# Patient Record
Sex: FEMALE | Race: WHITE | NOT HISPANIC OR LATINO | Employment: FULL TIME | URBAN - METROPOLITAN AREA
[De-identification: names, ages, dates, MRNs, and addresses within clinical notes are randomized per-mention and may not be internally consistent; named-entity substitution may affect disease eponyms.]

---

## 2017-01-02 DIAGNOSIS — Q05.6 THORACIC SPINAL MENINGOCELE (H): Primary | ICD-10-CM

## 2017-03-20 ENCOUNTER — OFFICE VISIT (OUTPATIENT)
Dept: NEUROSURGERY | Facility: CLINIC | Age: 47
End: 2017-03-20

## 2017-03-20 VITALS
HEART RATE: 99 BPM | BODY MASS INDEX: 29.3 KG/M2 | SYSTOLIC BLOOD PRESSURE: 136 MMHG | DIASTOLIC BLOOD PRESSURE: 85 MMHG | HEIGHT: 61 IN | WEIGHT: 155.2 LBS

## 2017-03-20 DIAGNOSIS — Q85.01 NEUROFIBROMATOSIS, TYPE 1 (H): ICD-10-CM

## 2017-03-20 DIAGNOSIS — Q05.9 MENINGOCELE SPINAL (H): Primary | ICD-10-CM

## 2017-03-20 ASSESSMENT — PAIN SCALES - GENERAL: PAINLEVEL: NO PAIN (0)

## 2017-03-20 NOTE — LETTER
"3/20/2017       RE: Esperanza Cortez  [address] [facility] [address]     Dear Colleague,    Thank you for referring your patient, Esperanza Cortez, to the [facility] NEUROSURGERY at [facility]. Please see a copy of my visit note below.    It was a pleasure to see Esperanza Cortez today in Neurosurgery Clinic. she is a 47 year old female who underwent repair of a thoracic meningocele in 2013. She is here for routine follow up. Otherwise doing reasonably well. Similar R abdominal wall complaints.    Vitals:    03/20/17 1039   BP: 136/85   BP Location: Left arm   Patient Position: Chair   Cuff Size: Adult Large   Pulse: 99   Weight: 70.4 kg (155 lb 3.2 oz)   Height: 1.549 m (5' 1\")     Body mass index is 29.32 kg/(m^2).  No Pain (0)    Awake, alert.  Neuro intact.    CXR stable from 2013.    A: s/p repair thoracic meningocele.    P: RTC 3 years with CXR.    Again, thank you for allowing me to participate in the care of your patient.      Sincerely,    Félix Fregoso MD      "

## 2017-03-20 NOTE — MR AVS SNAPSHOT
After Visit Summary   3/20/2017    Esperanza Cortez    MRN: [id number]           Patient Information     Date Of Birth          1970        Visit Information        Provider Department      3/20/2017 11:30 AM Félix Fregoso MD [facility] Neurosurgery        Today's Diagnoses     Meningocele spinal (H)    -  1    Neurofibromatosis, type 1 (H)          Care Instructions    PT. WILL CALL TO SCHED. APPT. -KB        Follow-ups after your visit        Additional Services     PT Referral (External Facility)       Physical Therapy Location:  Patient preference.    Eval and treat for right body pain and scar tissue. No restrictions. Scar tissue release.                  Follow-up notes from your care team     Return in about 3 years (around 3/20/2020) for Imaging.      Who to contact     Please call your clinic at [phone] to:    Ask questions about your health    Make or cancel appointments    Discuss your medicines    Learn about your test results    Speak to your doctor   If you have compliments or concerns about an experience at your clinic, or if you wish to file a complaint, please contact [facility] Patient Relations at [phone] or email us at [email or web address]         Additional Information About Your Visit        MyChart Information     Voxel (Internap)t gives you secure access to your electronic health record. If you see a primary care provider, you can also send messages to your care team and make appointments. If you have questions, please call your primary care clinic.  If you do not have a primary care provider, please call [phone] and they will assist you.      ChaseFuture is an electronic gateway that provides easy, online access to your medical records. With Chase[employer], you can request a clinic appointment, read your test results, renew a prescription or communicate with your care team.     To access your existing account, please contact your [place]  [facility] [place] [facility] or call [phone] for assistance.        Care EveryWhere ID     This is your Care EveryWhere ID. This could be used by other organizations to access your [place] medical records  [id number]        Your Vitals Were     Pulse Height BMI (Body Mass Index)             99 1.549 m (5' 1\") 29.32 kg/m2          Blood Pressure from Last 3 Encounters:   03/20/17 136/85   03/17/14 132/86   10/28/13 110/77    Weight from Last 3 Encounters:   03/20/17 70.4 kg (155 lb 3.2 oz)   03/17/14 55.8 kg (123 lb)   10/28/13 56.1 kg (123 lb 9.6 oz)              We Performed the Following     PT Referral (External Facility)        Primary Care Provider Office Phone # Fax #    Arti Regan [phone]       [facility] [address] [place], [place] [id number]        Thank you!     Thank you for choosing [facility]  for your care. Our goal is always to provide you with excellent care. Hearing back from our patients is one way we can continue to improve our services. Please take a few minutes to complete the written survey that you may receive in the mail after your visit with us. Thank you!             Your Updated Medication List - Protect others around you: Learn how to safely use, store and throw away your medicines at www.disposemymeds.org.          This list is accurate as of: 3/20/17 11:59 PM.  Always use your most recent med list.                   Brand Name Dispense Instructions for use    ATENOLOL PO          CYCLOBENZAPRINE HCL PO          FLONASE 50 MCG/ACT spray   Generic drug:  fluticasone      Spray 2 sprays into both nostrils daily       lovastatin 20 MG tablet    MEVACOR     Take 20 mg by mouth At Bedtime.       Multi-vitamin Tabs tablet   Generic drug:  multivitamin, therapeutic with minerals      Take 1 tablet by mouth daily.       omeprazole 20 MG tablet      Take 20 mg by mouth 2 times daily (before meals).       SPRINTEC 28 PO          temazepam 7.5 " MG capsule    RESTORIL     Take 7.5 mg by mouth nightly as needed for sleep       UNABLE TO FIND      Patient is taking Sleeping pill       VITAMIN B-12 PO      Take 1 tablet by mouth daily.

## 2017-03-20 NOTE — PROGRESS NOTES
"It was a pleasure to see Esperanza Cortez today in Neurosurgery Clinic. she is a 47 year old female who underwent repair of a thoracic meningocele in 2013. She is here for routine follow up. Otherwise doing reasonably well. Similar R abdominal wall complaints.    Vitals:    03/20/17 1039   BP: 136/85   BP Location: Left arm   Patient Position: Chair   Cuff Size: Adult Large   Pulse: 99   Weight: 70.4 kg (155 lb 3.2 oz)   Height: 1.549 m (5' 1\")     Body mass index is 29.32 kg/(m^2).  No Pain (0)    Awake, alert.  Neuro intact.    CXR stable from 2013.    A: s/p repair thoracic meningocele.    P: RTC 3 years with CXR.  "

## 2017-07-08 ENCOUNTER — HEALTH MAINTENANCE LETTER (OUTPATIENT)
Age: 47
End: 2017-07-08

## 2018-03-09 ENCOUNTER — TRANSFERRED RECORDS (OUTPATIENT)
Dept: HEALTH INFORMATION MANAGEMENT | Facility: CLINIC | Age: 48
End: 2018-03-09

## 2018-08-28 ENCOUNTER — TELEPHONE (OUTPATIENT)
Dept: NEUROSURGERY | Facility: CLINIC | Age: 48
End: 2018-08-28

## 2018-08-28 DIAGNOSIS — Q05.9 MENINGOCELE SPINAL (H): Primary | ICD-10-CM

## 2018-08-28 NOTE — TELEPHONE ENCOUNTER
Phone call to patient in response to Verivue message requesting appointment with Dr Fregoso.  Last seen 3/20/2017.  Recommended 3 year f/u at that time.      Patient is a 49 y/o lady s/p thoracic meningocele repair in 2013.  Continues to experience swelling near incision site - most pronounced at th end of the day - seems worse of last several months.   Will coordinate CXR and appointment with MD

## 2018-10-01 ENCOUNTER — OFFICE VISIT (OUTPATIENT)
Dept: NEUROSURGERY | Facility: CLINIC | Age: 48
End: 2018-10-01
Payer: COMMERCIAL

## 2018-10-01 ENCOUNTER — RADIANT APPOINTMENT (OUTPATIENT)
Dept: GENERAL RADIOLOGY | Facility: CLINIC | Age: 48
End: 2018-10-01
Attending: NEUROLOGICAL SURGERY
Payer: COMMERCIAL

## 2018-10-01 VITALS
HEIGHT: 61 IN | BODY MASS INDEX: 29.32 KG/M2 | SYSTOLIC BLOOD PRESSURE: 123 MMHG | TEMPERATURE: 98.4 F | DIASTOLIC BLOOD PRESSURE: 84 MMHG

## 2018-10-01 DIAGNOSIS — R20.0 RIGHT SIDED NUMBNESS: Primary | ICD-10-CM

## 2018-10-01 DIAGNOSIS — Q05.9 MENINGOCELE SPINAL (H): ICD-10-CM

## 2018-10-01 RX ORDER — HYDROCHLOROTHIAZIDE 12.5 MG/1
CAPSULE ORAL
Refills: 3 | COMMUNITY
Start: 2018-09-20

## 2018-10-01 RX ORDER — PREGABALIN 50 MG/1
CAPSULE ORAL
COMMUNITY
Start: 2018-08-17

## 2018-10-01 RX ORDER — LIDOCAINE 50 MG/G
PATCH TOPICAL
COMMUNITY
Start: 2018-08-15

## 2018-10-01 RX ORDER — METOPROLOL SUCCINATE 100 MG/1
TABLET, EXTENDED RELEASE ORAL
Refills: 3 | COMMUNITY
Start: 2018-09-20

## 2018-10-01 RX ORDER — LOVASTATIN 40 MG
TABLET ORAL
Refills: 3 | COMMUNITY
Start: 2018-09-20

## 2018-10-01 NOTE — NURSING NOTE
Chief Complaint   Patient presents with     RECHECK     swelling near incision site       Martine Cabrera MA

## 2018-10-01 NOTE — MR AVS SNAPSHOT
"              After Visit Summary   10/1/2018    Esperanza Cortez    MRN: [id number]           Patient Information     Date Of Birth          1970        Visit Information        Provider Department      10/1/2018 10:45 AM Félix Fregoso MD [facility] Neurosurgery        Today's Diagnoses     Right sided numbness    -  1       Follow-ups after your visit        Who to contact     Please call your clinic at [phone] to:    Ask questions about your health    Make or cancel appointments    Discuss your medicines    Learn about your test results    Speak to your doctor            Additional Information About Your Visit        Celenohart Information     Adstrix gives you secure access to your electronic health record. If you see a primary care provider, you can also send messages to your care team and make appointments. If you have questions, please call your primary care clinic.  If you do not have a primary care provider, please call [phone] and they will assist you.      Adstrix is an electronic gateway that provides easy, online access to your medical records. With Adstrix, you can request a clinic appointment, read your test results, renew a prescription or communicate with your care team.     To access your existing account, please contact your [facility] or call [phone] for assistance.        Care EveryWhere ID     This is your Care EveryWhere ID. This could be used by other organizations to access your [place] medical records  [id number]        Your Vitals Were     Temperature Height BMI (Body Mass Index)             98.4  F (36.9  C) 1.549 m (5' 1\") 29.32 kg/m2          Blood Pressure from Last 3 Encounters:   10/01/18 123/84   03/20/17 136/85   03/17/14 132/86    Weight from Last 3 Encounters:   03/20/17 70.4 kg (155 lb 3.2 oz)   03/17/14 55.8 kg (123 lb)   10/28/13 56.1 kg (123 lb 9.6 oz)                 Today's Medication Changes          These changes are " accurate as of 10/1/18 11:59 PM.  If you have any questions, ask your nurse or doctor.               Stop taking these medicines if you haven't already. Please contact your care team if you have questions.     CYCLOBENZAPRINE HCL PO   Stopped by:  Félix Fregoso MD           SPRINTEC 28 PO   Stopped by:  Félix Fergoso MD                    Primary Care Provider Office Phone # Fax #    Arti Regan [phone]       [address] [address]        Equal Access to Services     [facility]: Hadii aad ku hadasho Soomaali, waaxda luqadaha, qaybta kaalmada adeegyada, waxay idiin hayaan adeeg kharash laemiliano . [facility] [phone].    ATENCIÓN: Si habla español, tiene a aponte disposición servicios gratuitos de asistencia lingüística. [facility] [phone].    We comply with applicable federal civil rights laws and [place] laws. We do not discriminate on the basis of race, color, national origin, age, disability, sex, sexual orientation, or gender identity.            Thank you!     Thank you for choosing [facility]  for your care. Our goal is always to provide you with excellent care. Hearing back from our patients is one way we can continue to improve our services. Please take a few minutes to complete the written survey that you may receive in the mail after your visit with us. Thank you!             Your Updated Medication List - Protect others around you: Learn how to safely use, store and throw away your medicines at www.disposemymeds.org.          This list is accurate as of 10/1/18 11:59 PM.  Always use your most recent med list.                   Brand Name Dispense Instructions for use Diagnosis    amitriptyline 25 MG tablet    ELAVIL     Take 25 mg by mouth        ATENOLOL PO           CYMBALTA PO           FLONASE 50 MCG/ACT spray   Generic drug:  fluticasone      Spray 2 sprays into both nostrils daily        hydrochlorothiazide 12.5  MG capsule    MICROZIDE          lidocaine 5 % Patch    LIDODERM     Apply 1 patch to painful area of skin for up to 12 hours within a 24-hour period.        * lovastatin 20 MG tablet    MEVACOR     Take 20 mg by mouth At Bedtime.        * lovastatin 40 MG tablet    MEVACOR          LYRICA 50 MG capsule   Generic drug:  pregabalin           metoprolol succinate 100 MG 24 hr tablet    TOPROL-XL          Multi-vitamin Tabs tablet   Generic drug:  multivitamin, therapeutic with minerals      Take 1 tablet by mouth daily.        omeprazole 20 MG tablet      Take 20 mg by mouth 2 times daily (before meals).        temazepam 7.5 MG capsule    RESTORIL     Take 7.5 mg by mouth nightly as needed for sleep        tiZANidine 4 MG tablet    ZANAFLEX          UNABLE TO FIND      Patient is taking Sleeping pill        VITAMIN B-12 PO      Take 1 tablet by mouth daily.        * Notice:  This list has 2 medication(s) that are the same as other medications prescribed for you. Read the directions carefully, and ask your doctor or other care provider to review them with you.

## 2018-10-01 NOTE — LETTER
"10/1/2018       RE: Esperanza Cortez  [address] [facility] [facility] [address]     Dear Colleague,    Thank you for referring your patient, Esperanza Cortez, to the [facility] NEUROSURGERY at [facility]. Please see a copy of my visit note below.    10/1/2018  Neurosurgery Clinic Visit - Follow/up    Subjective:  Ms. Esperanza Cortez is a 49 yo F with PMH NF1, thoracic meningocele s/p repair 2013 presenting with concerns of increased swelling near R incision site worse at the end of the day, R arm numbness/tingling down to her fingers, and mild purple discoloration of fingertips bilaterally over the past year. She is otherwise stable. She is taking gabapentin, which helps somewhat, but would like greater relief of her numbness/tingling. No new headache, weakness, or other neurological symptoms.    Objective:     Physical exam:   /84 (BP Location: Left arm)  Temp 98.4  F (36.9  C)  Ht 1.549 m (5' 1\")  BMI 29.32 kg/m2    General: Awake and alert and in no acute distress.  Pulm: Breathing comfortably on room air  Abdomen: mild/stable R abdominal swelling near incision site  Motor: Good muscle bulk throughout. 5 out of 5 strength in bilateral upper extremities  Sensory: intact sensation to light touch, except R arm numbness/tingling from shoulder to 4th & 5th fingers  Extremities: mild purple discoloration of fingertips bilaterally    Imaging:  - CXR 10/1/18:  Impression:   1. 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  2. Left basilar atelectasis versus pleural effusion.    Assessment:    # thoracic meningocele s/p repair 2013  # chronic swelling near R abdominal incision site   # R arm numbness/tingling  # mild purple discoloration of fingertips bilaterally    Plan:    - Per CXR, thoracic meningocele appears stable  - Regarding chronic increased R abdominal incision swelling, we believe this is likely due to abdominal wall weakening due " "to the surgery and age. Unfortunately, we are unlikely to be able to fix this issue for now. No intervention recommended at this time.  - Regarding R arm numbness/tingling, may increase gabapentin per prescribing provider, MRI brain and cervical spine  - Patient interested in medical marijuana, may f/u with PCP if desired  - Regarding purple discoloration of fingertips, unlikely to be neurologically related. Recommend f/u with PCP for potential cardiac, nutrition, metabolic workup.  - No need to schedule f/u with neurosurgery clinic at this time. May return as needed.    Patient seen and discussed with Dr. Félix Fregoso.    Robles Scott MD  Neurosurgery Resident PGY1    I saw the patient with the resident.  I have reviewed and edited the resident note and agree with the plan of care.      Félix Fregoso MD       10/1/2018  Neurosurgery Clinic Visit - Follow/up     Subjective:  Ms. Esperanza Cortez is a 47 yo F with PMH NF1, thoracic meningocele s/p repair 2013 presenting with concerns of increased swelling near R incision site worse at the end of the day, R arm numbness/tingling down to her fingers, and mild purple discoloration of fingertips bilaterally over the past year. She is otherwise stable. She is taking gabapentin, which helps somewhat, but would like greater relief of her numbness/tingling. No new headache, weakness, or other neurological symptoms.     Objective:      Physical exam:   /84 (BP Location: Left arm)  Temp 98.4  F (36.9  C)  Ht 1.549 m (5' 1\")  BMI 29.32 kg/m2     General: Awake and alert and in no acute distress.  Pulm: Breathing comfortably on room air  Abdomen: mild/stable R abdominal swelling near incision site  Motor: Good muscle bulk throughout. 5 out of 5 strength in bilateral upper extremities  Sensory: intact sensation to light touch, except R arm numbness/tingling from shoulder to 4th & 5th fingers  Extremities: mild purple discoloration of fingertips bilaterally     Imaging:  - " CXR 10/1/18:  Impression:   1. 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  2. Left basilar atelectasis versus pleural effusion.     Assessment:     # thoracic meningocele s/p repair 2013  # chronic swelling near R abdominal incision site   # R arm numbness/tingling  # mild purple discoloration of fingertips bilaterally     Plan:     - Per CXR, thoracic meningocele appears stable  - Regarding chronic increased R abdominal incision swelling, we believe this is likely due to abdominal wall weakening due to the surgery and age. Unfortunately, we are unlikely to be able to fix this issue for now. No intervention recommended at this time.  - Regarding R arm numbness/tingling, may increase gabapentin per prescribing provider, MRI brain and cervical spine  - Patient interested in medical marijuana, may f/u with PCP if desired  - Regarding purple discoloration of fingertips, unlikely to be neurologically related. Recommend f/u with PCP for potential cardiac, nutrition, metabolic workup.  - No need to schedule f/u with neurosurgery clinic at this time. May return as needed.     Patient seen and discussed with Dr. Félix Fregoso.     Robles Scott MD  Neurosurgery Resident PGY1     I saw the patient with the resident.  I have reviewed and edited the resident note and agree with the plan of care.                Again, thank you for allowing me to participate in the care of your patient.      Sincerely,    Félix Fregoso MD

## 2018-10-01 NOTE — PROGRESS NOTES
"10/1/2018  Neurosurgery Clinic Visit - Follow/up    Subjective:  Ms. Esperanza Cortez is a 47 yo F with PMH NF1, thoracic meningocele s/p repair 2013 presenting with concerns of increased swelling near R incision site worse at the end of the day, R arm numbness/tingling down to her fingers, and mild purple discoloration of fingertips bilaterally over the past year. She is otherwise stable. She is taking gabapentin, which helps somewhat, but would like greater relief of her numbness/tingling. No new headache, weakness, or other neurological symptoms.    Objective:     Physical exam:   /84 (BP Location: Left arm)  Temp 98.4  F (36.9  C)  Ht 1.549 m (5' 1\")  BMI 29.32 kg/m2    General: Awake and alert and in no acute distress.  Pulm: Breathing comfortably on room air  Abdomen: mild/stable R abdominal swelling near incision site  Motor: Good muscle bulk throughout. 5 out of 5 strength in bilateral upper extremities  Sensory: intact sensation to light touch, except R arm numbness/tingling from shoulder to 4th & 5th fingers  Extremities: mild purple discoloration of fingertips bilaterally    Imaging:  - CXR 10/1/18:  Impression:   1. 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  2. Left basilar atelectasis versus pleural effusion.    Assessment:    # thoracic meningocele s/p repair 2013  # chronic swelling near R abdominal incision site   # R arm numbness/tingling  # mild purple discoloration of fingertips bilaterally    Plan:    - Per CXR, thoracic meningocele appears stable  - Regarding chronic increased R abdominal incision swelling, we believe this is likely due to abdominal wall weakening due to the surgery and age. Unfortunately, we are unlikely to be able to fix this issue for now. No intervention recommended at this time.  - Regarding R arm numbness/tingling, may increase gabapentin per prescribing provider, MRI brain and cervical spine  - Patient interested " in medical marijuana, may f/u with PCP if desired  - Regarding purple discoloration of fingertips, unlikely to be neurologically related. Recommend f/u with PCP for potential cardiac, nutrition, metabolic workup.  - No need to schedule f/u with neurosurgery clinic at this time. May return as needed.    Patient seen and discussed with Dr. Félix Fregoso.    Robles Scott MD  Neurosurgery Resident PGY1    I saw the patient with the resident.  I have reviewed and edited the resident note and agree with the plan of care.      Félix Fregoso MD

## 2018-10-05 NOTE — PROGRESS NOTES
"10/1/2018  Neurosurgery Clinic Visit - Follow/up     Subjective:  Ms. Esperanza Cortez is a 49 yo F with PMH NF1, thoracic meningocele s/p repair 2013 presenting with concerns of increased swelling near R incision site worse at the end of the day, R arm numbness/tingling down to her fingers, and mild purple discoloration of fingertips bilaterally over the past year. She is otherwise stable. She is taking gabapentin, which helps somewhat, but would like greater relief of her numbness/tingling. No new headache, weakness, or other neurological symptoms.     Objective:      Physical exam:   /84 (BP Location: Left arm)  Temp 98.4  F (36.9  C)  Ht 1.549 m (5' 1\")  BMI 29.32 kg/m2     General: Awake and alert and in no acute distress.  Pulm: Breathing comfortably on room air  Abdomen: mild/stable R abdominal swelling near incision site  Motor: Good muscle bulk throughout. 5 out of 5 strength in bilateral upper extremities  Sensory: intact sensation to light touch, except R arm numbness/tingling from shoulder to 4th & 5th fingers  Extremities: mild purple discoloration of fingertips bilaterally     Imaging:  - CXR 10/1/18:  Impression:   1. 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  2. Left basilar atelectasis versus pleural effusion.     Assessment:     # thoracic meningocele s/p repair 2013  # chronic swelling near R abdominal incision site   # R arm numbness/tingling  # mild purple discoloration of fingertips bilaterally     Plan:     - Per CXR, thoracic meningocele appears stable  - Regarding chronic increased R abdominal incision swelling, we believe this is likely due to abdominal wall weakening due to the surgery and age. Unfortunately, we are unlikely to be able to fix this issue for now. No intervention recommended at this time.  - Regarding R arm numbness/tingling, may increase gabapentin per prescribing provider, MRI brain and cervical spine  - Patient " interested in medical marijuana, may f/u with PCP if desired  - Regarding purple discoloration of fingertips, unlikely to be neurologically related. Recommend f/u with PCP for potential cardiac, nutrition, metabolic workup.  - No need to schedule f/u with neurosurgery clinic at this time. May return as needed.     Patient seen and discussed with Dr. Félix Fregoso.     Robles Scott MD  Neurosurgery Resident PGY1     I saw the patient with the resident.  I have reviewed and edited the resident note and agree with the plan of care.       Félix Fregoso MD

## 2019-10-04 ENCOUNTER — HEALTH MAINTENANCE LETTER (OUTPATIENT)
Age: 49
End: 2019-10-04

## 2019-11-02 ENCOUNTER — HEALTH MAINTENANCE LETTER (OUTPATIENT)
Age: 49
End: 2019-11-02

## 2020-11-14 ENCOUNTER — HEALTH MAINTENANCE LETTER (OUTPATIENT)
Age: 50
End: 2020-11-14

## 2021-01-15 ENCOUNTER — HEALTH MAINTENANCE LETTER (OUTPATIENT)
Age: 51
End: 2021-01-15

## 2021-09-12 ENCOUNTER — HEALTH MAINTENANCE LETTER (OUTPATIENT)
Age: 51
End: 2021-09-12

## 2022-01-02 ENCOUNTER — HEALTH MAINTENANCE LETTER (OUTPATIENT)
Age: 52
End: 2022-01-02

## 2022-02-27 ENCOUNTER — HEALTH MAINTENANCE LETTER (OUTPATIENT)
Age: 52
End: 2022-02-27

## 2022-10-30 ENCOUNTER — HEALTH MAINTENANCE LETTER (OUTPATIENT)
Age: 52
End: 2022-10-30

## 2023-02-27 ENCOUNTER — TELEPHONE (OUTPATIENT)
Dept: NEUROSURGERY | Facility: CLINIC | Age: 53
End: 2023-02-27

## 2023-02-27 NOTE — TELEPHONE ENCOUNTER
Patient requesting to speak with /nursing team. She had an xray done at [facility] in [place] and they wanted to speak with  because they had found some concerning things. Patient had surgery with  3/27/2013 and the xray was done on the patient's chest around the same area  did surgery on. Please call patient to discuss. Thank you ~

## 2023-02-27 NOTE — CONFIDENTIAL NOTE
"Patient called to update that she had imaging done at [facility] and provider there requested Dr. Fregoso review images due to \"concerning findings\".    Patient had CT ABD/Pelvis with contrast 2/10/23 and CXR 2/24/23. Reports in Care Everywhere.    Writer called [facility] medical records to get images pushed to PACS.     Patient had previous thoracic meningocele repair in 2013. Patient last saw Dr. Fregoso in 2017 who recommended follow-up in 3 years with CXR prior.     Advised patient Dr. Fregoso may require appointment be made prior to reviewing imaging. Will await imaging arrival in PACS then clarify next steps with Dr. Fregoso. Patient voiced agreement and understanding.  "

## 2023-02-28 NOTE — CONFIDENTIAL NOTE
CT and XR in PACS. Reports in care everywhere.     Encounter routed to Dr. Fregoso to determine next steps regarding follow-up.

## 2023-02-28 NOTE — TELEPHONE ENCOUNTER
Per Dr. Ildefonso WOMACK to schedule with him. Called patient to provide update, call transferred to scheduling team to coordinate.

## 2023-03-10 ENCOUNTER — OFFICE VISIT (OUTPATIENT)
Dept: NEUROSURGERY | Facility: CLINIC | Age: 53
End: 2023-03-10
Payer: COMMERCIAL

## 2023-03-10 VITALS — DIASTOLIC BLOOD PRESSURE: 84 MMHG | HEART RATE: 90 BPM | OXYGEN SATURATION: 96 % | SYSTOLIC BLOOD PRESSURE: 126 MMHG

## 2023-03-10 DIAGNOSIS — Q85.01 NEUROFIBROMATOSIS, TYPE 1 (H): Primary | ICD-10-CM

## 2023-03-10 DIAGNOSIS — Q05.9 MENINGOCELE SPINAL (H): ICD-10-CM

## 2023-03-10 PROCEDURE — 99203 OFFICE O/P NEW LOW 30 MIN: CPT | Performed by: NEUROLOGICAL SURGERY

## 2023-03-10 ASSESSMENT — PAIN SCALES - GENERAL: PAINLEVEL: SEVERE PAIN (6)

## 2023-03-10 NOTE — PROGRESS NOTES
"It was a pleasure to see Esperanza Cortez today in Neurosurgery Clinic. She is a 53 year old female who is well-known to me for her previous thoracic meningocele repair.    She is here today for routine follow-up after recent chest x-ray.  It sounds as if she has had some issues with kidney cancer and sleep apnea in the last year.    Vitals:    03/10/23 1252   BP: 126/84   Pulse: 90   SpO2: 96%     Estimated body mass index is 29.32 kg/m  as calculated from the following:    Height as of 10/1/18: 1.549 m (5' 1\").    Weight as of 3/20/17: 70.4 kg (155 lb 3.2 oz).  Severe Pain (6)    Awake and alert  Bilateral upper and lower extremity strength is 5 out of 5.    Imaging: Review of her x-ray from February in comparison with x-rays serially throughout her postoperative period shows that there is an area of residual meningocele above the mesh but this appears relatively stable in size over the last decade.  The imaging was reviewed with the patient showed the patient clinic today.    Assessment: Status post repair of thoracic meningocele, stable    Plan: Overall I think this thoracic meningocele is not likely to be causing any issues at this point I think it would be reasonable to obtain a chest x-ray every 3 to 5 years to monitor for growth.     "

## 2023-03-10 NOTE — NURSING NOTE
"Esperanza Cortez is a 53 year old female who presents for:  Chief Complaint   Patient presents with     RECHECK     Sensitive to the touch on the right side on mid back, swelling        Initial Vitals:  /84   Pulse 90   SpO2 96%  Estimated body mass index is 29.32 kg/m  as calculated from the following:    Height as of 10/1/18: 5' 1\" (1.549 m).    Weight as of 3/20/17: 155 lb 3.2 oz (70.4 kg).. There is no height or weight on file to calculate BSA. BP completed using cuff size: regular  Severe Pain (6)    Nursing Comments:     Ching Alonzo    "

## 2023-03-10 NOTE — LETTER
"    3/10/2023         RE: Esperanza Cortez  [address] [address] [facility] [address]        Dear Colleague,    Thank you for referring your patient, Esperanza Cortez, to the [facility]. Please see a copy of my visit note below.    It was a pleasure to see Esperanza Cortez today in Neurosurgery Clinic. She is a 53 year old female who is well-known to me for her previous thoracic meningocele repair.    She is here today for routine follow-up after recent chest x-ray.  It sounds as if she has had some issues with kidney cancer and sleep apnea in the last year.    Vitals:    03/10/23 1252   BP: 126/84   Pulse: 90   SpO2: 96%     Estimated body mass index is 29.32 kg/m  as calculated from the following:    Height as of 10/1/18: 1.549 m (5' 1\").    Weight as of 3/20/17: 70.4 kg (155 lb 3.2 oz).  Severe Pain (6)    Awake and alert  Bilateral upper and lower extremity strength is 5 out of 5.    Imaging: Review of her x-ray from February in comparison with x-rays serially throughout her postoperative period shows that there is an area of residual meningocele above the mesh but this appears relatively stable in size over the last decade.  The imaging was reviewed with the patient showed the patient clinic today.    Assessment: Status post repair of thoracic meningocele, stable    Plan: Overall I think this thoracic meningocele is not likely to be causing any issues at this point I think it would be reasonable to obtain a chest x-ray every 3 to 5 years to monitor for growth.         Again, thank you for allowing me to participate in the care of your patient.        Sincerely,        Félix Fregoso MD    "

## 2023-04-08 ENCOUNTER — HEALTH MAINTENANCE LETTER (OUTPATIENT)
Age: 53
End: 2023-04-08

## 2024-06-15 ENCOUNTER — HEALTH MAINTENANCE LETTER (OUTPATIENT)
Age: 54
End: 2024-06-15

## 2025-03-01 ENCOUNTER — HEALTH MAINTENANCE LETTER (OUTPATIENT)
Age: 55
End: 2025-03-01

## 2025-04-30 ENCOUNTER — ONCOLOGY VISIT (OUTPATIENT)
Dept: PEDIATRIC HEMATOLOGY/ONCOLOGY | Facility: CLINIC | Age: 55
End: 2025-04-30
Attending: PEDIATRICS
Payer: COMMERCIAL

## 2025-04-30 VITALS
OXYGEN SATURATION: 100 % | WEIGHT: 138.45 LBS | RESPIRATION RATE: 20 BRPM | HEART RATE: 87 BPM | DIASTOLIC BLOOD PRESSURE: 85 MMHG | HEIGHT: 61 IN | TEMPERATURE: 97.7 F | BODY MASS INDEX: 26.14 KG/M2 | SYSTOLIC BLOOD PRESSURE: 126 MMHG

## 2025-04-30 DIAGNOSIS — Q05.9 MENINGOCELE SPINAL (H): ICD-10-CM

## 2025-04-30 DIAGNOSIS — D36.10 PLEXIFORM NEUROFIBROMA: ICD-10-CM

## 2025-04-30 DIAGNOSIS — Q85.01 NEUROFIBROMATOSIS, TYPE 1 (H): Primary | ICD-10-CM

## 2025-04-30 DIAGNOSIS — G89.18 ACUTE POST-OPERATIVE PAIN: ICD-10-CM

## 2025-04-30 DIAGNOSIS — D36.10 CUTANEOUS NEUROFIBROMA: ICD-10-CM

## 2025-04-30 PROCEDURE — 99204 OFFICE O/P NEW MOD 45 MIN: CPT | Performed by: PEDIATRICS

## 2025-04-30 PROCEDURE — 99213 OFFICE O/P EST LOW 20 MIN: CPT | Performed by: PEDIATRICS

## 2025-04-30 RX ORDER — SEMAGLUTIDE 2.4 MG/.75ML
2.4 INJECTION, SOLUTION SUBCUTANEOUS WEEKLY
COMMUNITY
Start: 2025-03-17

## 2025-04-30 ASSESSMENT — ENCOUNTER SYMPTOMS
SHORTNESS OF BREATH: 0
PALPITATIONS: 0
HEADACHES: 1
DIARRHEA: 0
NAUSEA: 0
CONSTIPATION: 1
CONSTITUTIONAL NEGATIVE: 1
PSYCHIATRIC NEGATIVE: 1
BLOOD IN STOOL: 0
BLURRED VISION: 0
DOUBLE VISION: 0
TINGLING: 1
BACK PAIN: 1
EYE PAIN: 0
ABDOMINAL PAIN: 0
HEARTBURN: 0
COUGH: 0
VOMITING: 0

## 2025-04-30 NOTE — LETTER
4/30/2025      RE: Esperanza Cortez  [address] [address] [facility] [address]     Dear Colleague,    Thank you for the opportunity to participate in the care of your patient, Esperanza Cortez, at the [facility] at [facility]. Please see a copy of my visit note below.    HPI  Esperanza Cortez is a 55 year old with a history of NF1 and a thoracic meningocele who presents to the clinic to re-establish NF care, last seen in our clinic on 12/16/2011. She was referred to the clinic by Dr. Félix Fregoso. She comes to the clinic with her , Franko.     Post Esperanza's meningocele removal, she notes a lot of nerve damage in the surgical area and deals with nerve sensitivity and pain. Her initial procedure resulted in her lung collapsing. A titanium mesh was then put in place, which resolved the issue. Her most recent follow-up appointment with Dr. Fregoso was about 1 year ago. Everything has been stable since then. Her hyperesthesia persists and it the main cause for her back pain. Gabapentin does not help relieve any of her hyperesthesia pain or discomfort, and Novocain patches only work temporarily. Additionally, she has had corrective surgery for dystrophic scoliosis. She notes a pouch of skin on her right flank post lung procedures and is attending physical therapy for it.     Esperanza had diverticulitis about 3-4 years ago and a small renal cell carcinoma was discovered during her hospital stay. Both issues have been resolved, her renal cell carcinoma surgically removed. Her most recent scan was in February 2025 with no concerning findings. She has a renal ultrasound scheduled soon.    She gets headaches often, but they are not focal and often resolve after ibuprofen. She saw an ophthalmologist two weeks ago with no significant vision changes aside from cataracts beginning in one eye. She had an audiogram about 1 year ago and notes that it is hard to  hear when there is background noise. She notes occasional tingling in her feet. Her cutaneous neurofibromas grow slowly and she has trouble identifying which are new and which are not. She had a few small cutaneous neurofibromas removed 2 weeks ago. The only cutaneous neurofibroma that causes pain currently is one on her low back/gluteus. She notes pain upon palpation in that area.     She typically takes medications to manage her hypertension, but has recently lost 30 lbs after starting Wegovy, consequently lowering her blood pressure. Her other medications include Ibuprofen prn for headache management and Rosuvastatin for cholesterol management. She had a high density mammogram conducted within the past year.      Current Outpatient Medications   Medication Sig Dispense Refill     amitriptyline (ELAVIL) 25 MG tablet Take 25 mg by mouth       ATENOLOL PO        Cyanocobalamin (VITAMIN B-12 PO) Take 1 tablet by mouth daily.       DULoxetine HCl (CYMBALTA PO)        fluticasone (FLONASE) 50 MCG/ACT nasal spray Spray 2 sprays into both nostrils daily       hydrochlorothiazide (MICROZIDE) 12.5 MG capsule   3     lidocaine (LIDODERM) 5 % Patch Apply 1 patch to painful area of skin for up to 12 hours within a 24-hour period.       lovastatin (MEVACOR) 20 MG tablet Take 20 mg by mouth At Bedtime.       lovastatin (MEVACOR) 40 MG tablet   3     metoprolol succinate (TOPROL-XL) 100 MG 24 hr tablet   3     Multiple Vitamin (MULTI-VITAMIN) per tablet Take 1 tablet by mouth daily.       Omeprazole 20 MG tablet Take 20 mg by mouth 2 times daily (before meals).       pregabalin (LYRICA) 50 MG capsule        temazepam (RESTORIL) 7.5 MG capsule Take 7.5 mg by mouth nightly as needed for sleep       tiZANidine (ZANAFLEX) 4 MG tablet   2     UNABLE TO FIND Patient is taking Sleeping pill       No current facility-administered medications for this visit.     Review of Systems   Constitutional: Negative.    HENT:  Negative for ear  "pain, hearing loss and tinnitus.    Eyes:  Negative for blurred vision, double vision and pain.   Respiratory:  Negative for cough and shortness of breath.    Cardiovascular:  Negative for chest pain and palpitations.   Gastrointestinal:  Positive for constipation (possibly secondary to fiber supplement and Wegovy). Negative for abdominal pain, blood in stool, diarrhea, heartburn, nausea and vomiting.   Genitourinary: Negative.    Musculoskeletal:  Positive for back pain.   Skin:  Positive for itching. Negative for rash.   Neurological:  Positive for tingling (random, intermittent, in feet bilaterally) and headaches (non-focal, relieved w/ ibuprofen).   Endo/Heme/Allergies: Negative.    Psychiatric/Behavioral: Negative.     All other systems reviewed and are negative.    /85 (BP Location: Right arm, Patient Position: Sitting, Cuff Size: Adult Regular)   Pulse 87   Temp 97.7  F (36.5  C) (Oral)   Resp 20   Ht 1.554 m (5' 1.18\")   Wt 62.8 kg (138 lb 7.2 oz)   SpO2 100%   BMI 26.01 kg/m      Physical Exam  Vitals reviewed. Exam conducted with a chaperone present.   Constitutional:       Appearance: Normal appearance.   HENT:      Head: Normocephalic.      Right Ear: External ear normal.      Left Ear: External ear normal.      Nose: Nose normal.      Mouth/Throat:      Mouth: Mucous membranes are moist.      Pharynx: Oropharynx is clear.   Eyes:      Extraocular Movements: Extraocular movements intact.      Conjunctiva/sclera: Conjunctivae normal.      Pupils: Pupils are equal, round, and reactive to light.   Cardiovascular:      Rate and Rhythm: Normal rate and regular rhythm.      Pulses: Normal pulses.      Heart sounds: Normal heart sounds.   Pulmonary:      Effort: Pulmonary effort is normal.      Breath sounds: Examination of the right-lower field reveals decreased breath sounds. Decreased breath sounds present.   Musculoskeletal:         General: Normal range of motion.      Cervical back: Normal " range of motion and neck supple.   Skin:     General: Skin is warm and dry.      Capillary Refill: Capillary refill takes less than 2 seconds.      Comments: Multiple cutaneous neurofibromas of the face, hands, and feet   Neurological:      General: No focal deficit present.      Mental Status: She is alert and oriented to person, place, and time. Mental status is at baseline.   Psychiatric:         Mood and Affect: Mood normal.         Behavior: Behavior normal.         Thought Content: Thought content normal.         Judgment: Judgment normal.     Impression:  NF1  Hx of thoracic meningocele  Post-operative hyperesthesia R chest and flank     Plan:  Referral to Dr. Tay for neurofibroma removal.   Referral to pain clinic for hyperesthesia.   Recommended she alternate renal ultrasounds and MRIs to reduce radiation exposure.   We discussed new medications and studies in regards to NF1, including the NFlection cream in development and the Nutraceutical trial (HPEVOO and curcumin).   Follow-up with Dr. Hendrix in 2 years or prn.      F/U in 2 years.      Total time spent on the following services on the date of the encounter:  Preparing to see patient, chart review, review of outside records, Referring or communicating with other healthcare professionals, Interpretation of labs, imaging and other tests, Performing a medically appropriate examination , Documenting clinical information in the electronic or other health record  and Total time spent: *** minutes    I, Yuni Blunt, am working as a scribe for and in the presence of Dr. Weiss on April 30, 2025. Dr. Weiss performed the services described in this note and the note is both complete and accurate.     Davin Weiss MD      Please do not hesitate to contact me if you have any questions/concerns.     Sincerely,       Davin Weiss MD

## 2025-04-30 NOTE — PATIENT INSTRUCTIONS
Plan:  Referral to plastic surgery for neurofibroma above gluteal crease  Chronic dysesthesia - referral to pain clinic for management  Follow up in 2 years     Can use Olive oil and curcumin for cutaneous neurofibromas  Breast cancer screening consider MRI/US instead of mammograms

## 2025-04-30 NOTE — NURSING NOTE
"Chief Complaint   Patient presents with    Consult     New patient consult NF1 Hx of Thoracic Meningocele      /85 (BP Location: Right arm, Patient Position: Sitting, Cuff Size: Adult Regular)   Pulse 87   Temp 97.7  F (36.5  C) (Oral)   Resp 20   Ht 1.554 m (5' 1.18\")   Wt 62.8 kg (138 lb 7.2 oz)   SpO2 100%   BMI 26.01 kg/m      Data Unavailable  Data Unavailable    I have reviewed the patients medications and allergies    Height/weight double check needed? No    Peds Outpatient BP  1) Rested for 5 minutes, BP taken on bare arm, patient sitting (or supine for infants) w/ legs uncrossed?   Yes  2) Right arm used?  Right arm   Yes  3) Arm circumference of largest part of upper arm (in cm): 30cm  4) BP cuff sized used: Adult (25-32cm)   If used different size cuff then what was recommended why? N/A  5) First BP reading:machine   BP Readings from Last 1 Encounters:   04/30/25 126/85      Is reading >90%?Yes   (90% for <1 years is 90/50)  (90% for >18 years is 140/90)  *If a machine BP is at or above 90% take manual BP  6) Manual BP reading: N/A  7) Other comments: None          Janet Baer CMA  April 30, 2025    "

## 2025-04-30 NOTE — PROGRESS NOTES
RAUL Cortez is a 55 year old with a history of NF1 and a thoracic meningocele who presents to the clinic to re-establish NF care, last seen in our clinic on 12/16/2011. She was referred to the clinic by Dr. Félix Fregoso. She comes to the clinic with her , Franko.     Post Esperanza's meningocele removal, she notes a lot of nerve damage in the surgical area and deals with nerve sensitivity and pain. Her initial procedure resulted in her lung collapsing. A titanium mesh was then put in place, which resolved the issue. Her most recent follow-up appointment with Dr. Fregoso was about 1 year ago. Everything has been stable since then. Her hyperesthesia persists and it the main cause for her back pain. Gabapentin does not help relieve any of her hyperesthesia pain or discomfort, and Novocain patches only work temporarily. Additionally, she has had corrective surgery for dystrophic scoliosis. She notes a pouch of skin on her right flank post lung procedures and is attending physical therapy for it.     Esperanza had diverticulitis about 3-4 years ago and a small renal cell carcinoma was discovered during her hospital stay. Both issues have been resolved, her renal cell carcinoma surgically removed. Her most recent scan was in February 2025 with no concerning findings. She has a renal ultrasound scheduled soon.    She gets headaches often, but they are not focal and often resolve after ibuprofen. She saw an ophthalmologist two weeks ago with no significant vision changes aside from cataracts beginning in one eye. She had an audiogram about 1 year ago and notes that it is hard to hear when there is background noise. She notes occasional tingling in her feet. Her cutaneous neurofibromas grow slowly and she has trouble identifying which are new and which are not. She had a few small cutaneous neurofibromas removed 2 weeks ago. The only cutaneous neurofibroma that causes pain currently is one on her low back/gluteus. She notes  pain upon palpation in that area.     She typically takes medications to manage her hypertension, but has recently lost 30 lbs after starting Wegovy, consequently lowering her blood pressure. Her other medications include Ibuprofen prn for headache management and Rosuvastatin for cholesterol management. She had a high density mammogram conducted within the past year.      Current Outpatient Medications   Medication Sig Dispense Refill    amitriptyline (ELAVIL) 25 MG tablet Take 25 mg by mouth      ATENOLOL PO       Cyanocobalamin (VITAMIN B-12 PO) Take 1 tablet by mouth daily.      DULoxetine HCl (CYMBALTA PO)       fluticasone (FLONASE) 50 MCG/ACT nasal spray Spray 2 sprays into both nostrils daily      hydrochlorothiazide (MICROZIDE) 12.5 MG capsule   3    lidocaine (LIDODERM) 5 % Patch Apply 1 patch to painful area of skin for up to 12 hours within a 24-hour period.      lovastatin (MEVACOR) 20 MG tablet Take 20 mg by mouth At Bedtime.      lovastatin (MEVACOR) 40 MG tablet   3    metoprolol succinate (TOPROL-XL) 100 MG 24 hr tablet   3    Multiple Vitamin (MULTI-VITAMIN) per tablet Take 1 tablet by mouth daily.      Omeprazole 20 MG tablet Take 20 mg by mouth 2 times daily (before meals).      pregabalin (LYRICA) 50 MG capsule       temazepam (RESTORIL) 7.5 MG capsule Take 7.5 mg by mouth nightly as needed for sleep      tiZANidine (ZANAFLEX) 4 MG tablet   2    UNABLE TO FIND Patient is taking Sleeping pill       No current facility-administered medications for this visit.     Review of Systems   Constitutional: Negative.    HENT:  Negative for ear pain, hearing loss and tinnitus.    Eyes:  Negative for blurred vision, double vision and pain.   Respiratory:  Negative for cough and shortness of breath.    Cardiovascular:  Negative for chest pain and palpitations.   Gastrointestinal:  Positive for constipation (possibly secondary to fiber supplement and Wegovy). Negative for abdominal pain, blood in stool,  "diarrhea, heartburn, nausea and vomiting.   Genitourinary: Negative.    Musculoskeletal:  Positive for back pain.   Skin:  Positive for itching. Negative for rash.   Neurological:  Positive for tingling (random, intermittent, in feet bilaterally) and headaches (non-focal, relieved w/ ibuprofen).   Endo/Heme/Allergies: Negative.    Psychiatric/Behavioral: Negative.     All other systems reviewed and are negative.    /85 (BP Location: Right arm, Patient Position: Sitting, Cuff Size: Adult Regular)   Pulse 87   Temp 97.7  F (36.5  C) (Oral)   Resp 20   Ht 1.554 m (5' 1.18\")   Wt 62.8 kg (138 lb 7.2 oz)   SpO2 100%   BMI 26.01 kg/m      Physical Exam  Vitals reviewed. Exam conducted with a chaperone present.   Constitutional:       Appearance: Normal appearance.   HENT:      Head: Normocephalic.      Right Ear: External ear normal.      Left Ear: External ear normal.      Nose: Nose normal.      Mouth/Throat:      Mouth: Mucous membranes are moist.      Pharynx: Oropharynx is clear.   Eyes:      Extraocular Movements: Extraocular movements intact.      Conjunctiva/sclera: Conjunctivae normal.      Pupils: Pupils are equal, round, and reactive to light.   Cardiovascular:      Rate and Rhythm: Normal rate and regular rhythm.      Pulses: Normal pulses.      Heart sounds: Normal heart sounds.   Pulmonary:      Effort: Pulmonary effort is normal.      Breath sounds: Examination of the right-lower field reveals decreased breath sounds. Decreased breath sounds present.   Musculoskeletal:         General: Normal range of motion.      Cervical back: Normal range of motion and neck supple.   Skin:     General: Skin is warm and dry.      Capillary Refill: Capillary refill takes less than 2 seconds.      Comments: Multiple cutaneous neurofibromas of the face, hands, and feet   Neurological:      General: No focal deficit present.      Mental Status: She is alert and oriented to person, place, and time. Mental status " is at baseline.   Psychiatric:         Mood and Affect: Mood normal.         Behavior: Behavior normal.         Thought Content: Thought content normal.         Judgment: Judgment normal.     Impression:  NF1  Hx of thoracic meningocele  Post-operative hyperesthesia R chest and flank     Plan:  Referral to Dr. Tay for painful cutaneous neurofibroma removal.   Referral to pain clinic for hyperesthesia of R chest/abdomen   Recommended she alternate renal ultrasounds and MRIs to reduce radiation exposure.   We discussed new medications and studies in regards to NF1, including the NFlection cream in development and the Nutraceutical trial (HPEVOO and curcumin).   Follow-up with Dr. Hendrix in 2 years or prn.      F/U in 2 years.      Total time spent on the following services on the date of the encounter:  Preparing to see patient, chart review, review of outside records, Referring or communicating with other healthcare professionals, Interpretation of labs, imaging and other tests, Performing a medically appropriate examination , Documenting clinical information in the electronic or other health record  and Total time spent: 45 minutes    IYuni, am working as a scribe for and in the presence of Dr. Weiss on April 30, 2025. Dr. Weiss performed the services described in this note and the note is both complete and accurate.     Daivn Weiss MD

## 2025-05-01 ENCOUNTER — PATIENT OUTREACH (OUTPATIENT)
Dept: CARE COORDINATION | Facility: CLINIC | Age: 55
End: 2025-05-01
Payer: COMMERCIAL

## 2025-05-05 ENCOUNTER — PATIENT OUTREACH (OUTPATIENT)
Dept: CARE COORDINATION | Facility: CLINIC | Age: 55
End: 2025-05-05
Payer: COMMERCIAL